# Patient Record
Sex: MALE | Race: WHITE | Employment: FULL TIME | URBAN - METROPOLITAN AREA
[De-identification: names, ages, dates, MRNs, and addresses within clinical notes are randomized per-mention and may not be internally consistent; named-entity substitution may affect disease eponyms.]

---

## 2018-08-14 ENCOUNTER — HOSPITAL ENCOUNTER (OUTPATIENT)
Dept: GENERAL RADIOLOGY | Age: 63
Discharge: HOME | End: 2018-08-16
Payer: COMMERCIAL

## 2018-08-14 ENCOUNTER — HOSPITAL ENCOUNTER (OUTPATIENT)
Age: 63
Discharge: HOME | End: 2018-08-16
Payer: COMMERCIAL

## 2018-08-14 DIAGNOSIS — T14.90XA INJURY: ICD-10-CM

## 2018-08-14 PROCEDURE — 73130 X-RAY EXAM OF HAND: CPT

## 2020-04-09 RX ORDER — TESTOSTERONE 16.2 MG/G
GEL TRANSDERMAL
Qty: 1 BOTTLE | Refills: 4 | OUTPATIENT
Start: 2020-04-09

## 2021-11-01 ENCOUNTER — ANESTHESIA EVENT (OUTPATIENT)
Dept: OPERATING ROOM | Age: 66
End: 2021-11-01
Payer: MEDICARE

## 2021-11-02 ENCOUNTER — HOSPITAL ENCOUNTER (OUTPATIENT)
Age: 66
Setting detail: OUTPATIENT SURGERY
Discharge: HOME | End: 2021-11-02
Attending: COLON & RECTAL SURGERY | Admitting: COLON & RECTAL SURGERY
Payer: MEDICARE

## 2021-11-02 ENCOUNTER — ANESTHESIA (OUTPATIENT)
Dept: OPERATING ROOM | Age: 66
End: 2021-11-02
Payer: MEDICARE

## 2021-11-02 VITALS
HEART RATE: 64 BPM | HEIGHT: 67 IN | TEMPERATURE: 97.2 F | BODY MASS INDEX: 31.78 KG/M2 | DIASTOLIC BLOOD PRESSURE: 81 MMHG | WEIGHT: 202.5 LBS | OXYGEN SATURATION: 95 % | SYSTOLIC BLOOD PRESSURE: 125 MMHG | RESPIRATION RATE: 15 BRPM

## 2021-11-02 VITALS
OXYGEN SATURATION: 96 % | DIASTOLIC BLOOD PRESSURE: 72 MMHG | SYSTOLIC BLOOD PRESSURE: 137 MMHG | RESPIRATION RATE: 15 BRPM

## 2021-11-02 PROCEDURE — 3609027000 HC COLONOSCOPY: Performed by: COLON & RECTAL SURGERY

## 2021-11-02 PROCEDURE — 6360000002 HC RX W HCPCS: Performed by: NURSE ANESTHETIST, CERTIFIED REGISTERED

## 2021-11-02 PROCEDURE — 7100000011 HC PHASE II RECOVERY - ADDTL 15 MIN: Performed by: COLON & RECTAL SURGERY

## 2021-11-02 PROCEDURE — 2580000003 HC RX 258: Performed by: NURSE ANESTHETIST, CERTIFIED REGISTERED

## 2021-11-02 PROCEDURE — 2709999900 HC NON-CHARGEABLE SUPPLY: Performed by: COLON & RECTAL SURGERY

## 2021-11-02 PROCEDURE — 2500000003 HC RX 250 WO HCPCS: Performed by: NURSE ANESTHETIST, CERTIFIED REGISTERED

## 2021-11-02 PROCEDURE — 3700000000 HC ANESTHESIA ATTENDED CARE: Performed by: COLON & RECTAL SURGERY

## 2021-11-02 PROCEDURE — 2720000010 HC SURG SUPPLY STERILE: Performed by: COLON & RECTAL SURGERY

## 2021-11-02 PROCEDURE — 3700000001 HC ADD 15 MINUTES (ANESTHESIA): Performed by: COLON & RECTAL SURGERY

## 2021-11-02 PROCEDURE — 7100000010 HC PHASE II RECOVERY - FIRST 15 MIN: Performed by: COLON & RECTAL SURGERY

## 2021-11-02 PROCEDURE — 2580000003 HC RX 258: Performed by: ANESTHESIOLOGY

## 2021-11-02 RX ORDER — SODIUM CHLORIDE 0.9 % (FLUSH) 0.9 %
10 SYRINGE (ML) INJECTION PRN
Status: DISCONTINUED | OUTPATIENT
Start: 2021-11-02 | End: 2021-11-02 | Stop reason: HOSPADM

## 2021-11-02 RX ORDER — LIDOCAINE HYDROCHLORIDE 10 MG/ML
1 INJECTION, SOLUTION EPIDURAL; INFILTRATION; INTRACAUDAL; PERINEURAL
Status: DISCONTINUED | OUTPATIENT
Start: 2021-11-03 | End: 2021-11-02 | Stop reason: HOSPADM

## 2021-11-02 RX ORDER — MELOXICAM 15 MG/1
15 TABLET ORAL DAILY
COMMUNITY
Start: 2021-10-19

## 2021-11-02 RX ORDER — LIDOCAINE HYDROCHLORIDE 20 MG/ML
INJECTION, SOLUTION EPIDURAL; INFILTRATION; INTRACAUDAL; PERINEURAL PRN
Status: DISCONTINUED | OUTPATIENT
Start: 2021-11-02 | End: 2021-11-02 | Stop reason: SDUPTHER

## 2021-11-02 RX ORDER — ONDANSETRON 2 MG/ML
4 INJECTION INTRAMUSCULAR; INTRAVENOUS
Status: DISCONTINUED | OUTPATIENT
Start: 2021-11-02 | End: 2021-11-02 | Stop reason: HOSPADM

## 2021-11-02 RX ORDER — OMEGA-3 FATTY ACIDS CAP DELAYED RELEASE 1000 MG 1000 MG
3000 CAPSULE DELAYED RELEASE ORAL
COMMUNITY

## 2021-11-02 RX ORDER — ALPRAZOLAM 0.5 MG/1
0.5 TABLET ORAL NIGHTLY PRN
COMMUNITY
Start: 2021-09-26

## 2021-11-02 RX ORDER — TRAZODONE HYDROCHLORIDE 100 MG/1
100 TABLET ORAL NIGHTLY
COMMUNITY
Start: 2021-10-24

## 2021-11-02 RX ORDER — SODIUM CHLORIDE 0.9 % (FLUSH) 0.9 %
10 SYRINGE (ML) INJECTION EVERY 12 HOURS SCHEDULED
Status: DISCONTINUED | OUTPATIENT
Start: 2021-11-02 | End: 2021-11-02 | Stop reason: HOSPADM

## 2021-11-02 RX ORDER — TRAMADOL HYDROCHLORIDE 50 MG/1
50 TABLET ORAL NIGHTLY PRN
COMMUNITY
Start: 2021-09-29

## 2021-11-02 RX ORDER — HYDROMORPHONE HYDROCHLORIDE 1 MG/ML
0.25 INJECTION, SOLUTION INTRAMUSCULAR; INTRAVENOUS; SUBCUTANEOUS EVERY 5 MIN PRN
Status: DISCONTINUED | OUTPATIENT
Start: 2021-11-02 | End: 2021-11-02 | Stop reason: HOSPADM

## 2021-11-02 RX ORDER — SODIUM CHLORIDE, SODIUM LACTATE, POTASSIUM CHLORIDE, CALCIUM CHLORIDE 600; 310; 30; 20 MG/100ML; MG/100ML; MG/100ML; MG/100ML
INJECTION, SOLUTION INTRAVENOUS CONTINUOUS PRN
Status: DISCONTINUED | OUTPATIENT
Start: 2021-11-02 | End: 2021-11-02 | Stop reason: SDUPTHER

## 2021-11-02 RX ORDER — PROPOFOL 10 MG/ML
INJECTION, EMULSION INTRAVENOUS PRN
Status: DISCONTINUED | OUTPATIENT
Start: 2021-11-02 | End: 2021-11-02 | Stop reason: SDUPTHER

## 2021-11-02 RX ORDER — NIFEDIPINE 30 MG/1
30 TABLET, EXTENDED RELEASE ORAL DAILY
COMMUNITY
Start: 2021-09-29

## 2021-11-02 RX ORDER — FENTANYL CITRATE 50 UG/ML
INJECTION, SOLUTION INTRAMUSCULAR; INTRAVENOUS PRN
Status: DISCONTINUED | OUTPATIENT
Start: 2021-11-02 | End: 2021-11-02 | Stop reason: SDUPTHER

## 2021-11-02 RX ORDER — SODIUM CHLORIDE 9 MG/ML
25 INJECTION, SOLUTION INTRAVENOUS PRN
Status: DISCONTINUED | OUTPATIENT
Start: 2021-11-02 | End: 2021-11-02 | Stop reason: HOSPADM

## 2021-11-02 RX ORDER — LEVOTHYROXINE SODIUM 0.07 MG/1
75 TABLET ORAL DAILY
COMMUNITY
Start: 2021-10-10

## 2021-11-02 RX ORDER — SODIUM CHLORIDE 9 MG/ML
INJECTION, SOLUTION INTRAVENOUS CONTINUOUS
Status: DISCONTINUED | OUTPATIENT
Start: 2021-11-03 | End: 2021-11-02

## 2021-11-02 RX ORDER — FENOFIBRATE 145 MG/1
145 TABLET, COATED ORAL DAILY
COMMUNITY
Start: 2021-09-29

## 2021-11-02 RX ORDER — TAMSULOSIN HYDROCHLORIDE 0.4 MG/1
0.4 CAPSULE ORAL DAILY
COMMUNITY
Start: 2021-10-13

## 2021-11-02 RX ORDER — FENTANYL CITRATE 50 UG/ML
25 INJECTION, SOLUTION INTRAMUSCULAR; INTRAVENOUS EVERY 5 MIN PRN
Status: DISCONTINUED | OUTPATIENT
Start: 2021-11-02 | End: 2021-11-02 | Stop reason: HOSPADM

## 2021-11-02 RX ORDER — SODIUM CHLORIDE, SODIUM LACTATE, POTASSIUM CHLORIDE, CALCIUM CHLORIDE 600; 310; 30; 20 MG/100ML; MG/100ML; MG/100ML; MG/100ML
INJECTION, SOLUTION INTRAVENOUS CONTINUOUS
Status: DISCONTINUED | OUTPATIENT
Start: 2021-11-03 | End: 2021-11-02 | Stop reason: HOSPADM

## 2021-11-02 RX ADMIN — LIDOCAINE HYDROCHLORIDE 50 MG: 20 INJECTION, SOLUTION EPIDURAL; INFILTRATION; INTRACAUDAL; PERINEURAL at 07:37

## 2021-11-02 RX ADMIN — SODIUM CHLORIDE, POTASSIUM CHLORIDE, SODIUM LACTATE AND CALCIUM CHLORIDE: 600; 310; 30; 20 INJECTION, SOLUTION INTRAVENOUS at 07:37

## 2021-11-02 RX ADMIN — SODIUM CHLORIDE, POTASSIUM CHLORIDE, SODIUM LACTATE AND CALCIUM CHLORIDE: 600; 310; 30; 20 INJECTION, SOLUTION INTRAVENOUS at 06:56

## 2021-11-02 RX ADMIN — PROPOFOL 40 MG: 10 INJECTION, EMULSION INTRAVENOUS at 07:49

## 2021-11-02 RX ADMIN — Medication 50 MCG: at 07:42

## 2021-11-02 RX ADMIN — PROPOFOL 40 MG: 10 INJECTION, EMULSION INTRAVENOUS at 07:56

## 2021-11-02 RX ADMIN — Medication 50 MCG: at 07:39

## 2021-11-02 RX ADMIN — PROPOFOL 70 MG: 10 INJECTION, EMULSION INTRAVENOUS at 07:43

## 2021-11-02 RX ADMIN — PROPOFOL 70 MG: 10 INJECTION, EMULSION INTRAVENOUS at 07:37

## 2021-11-02 ASSESSMENT — PULMONARY FUNCTION TESTS
PIF_VALUE: 1
PIF_VALUE: 0
PIF_VALUE: 1
PIF_VALUE: 0
PIF_VALUE: 0
PIF_VALUE: 1
PIF_VALUE: 0
PIF_VALUE: 1
PIF_VALUE: 0
PIF_VALUE: 1
PIF_VALUE: 0
PIF_VALUE: 1
PIF_VALUE: 0
PIF_VALUE: 1
PIF_VALUE: 1
PIF_VALUE: 0
PIF_VALUE: 1

## 2021-11-02 ASSESSMENT — PAIN SCALES - GENERAL
PAINLEVEL_OUTOF10: 0

## 2021-11-02 ASSESSMENT — PAIN - FUNCTIONAL ASSESSMENT: PAIN_FUNCTIONAL_ASSESSMENT: 0-10

## 2021-11-02 NOTE — OP NOTE
Operative Note      Patient: Mikayla Ramos  YOB: 1955  MRN: [id number]    Date of Procedure: 11/2/2021    Pre-Op Diagnosis: DX SCREENING, BLEEDING, HEMORRHOIDS, CHANGE IN BOWEL HABITS    Post-Op Diagnosis: Same       Procedure(s):  COLONOSCOPY DIAGNOSTIC  POSSIBLE      APC TREATMENT  HEMORRHOIDECTOMY    Surgeon(s):  Lisa Molina MD    Assistant:   Resident: Amy Lynch DO    Anesthesia: Monitor Anesthesia Care    Estimated Blood Loss (mL): Minimal    Complications: None    Specimens:   * No specimens in log *    Implants:  * No implants in log *      Drains: * No LDAs found *    Findings: WC-IV. Mild diverticulosis in the transverse to sigmoid colon, grade I internal hemorrhoids. Indication: Patient having small amounts rectal bleeding with bowel movements and known internal and external hemorrhoids, also needing routine screening colonoscopy. Consent obtained in office. Detailed Description of Procedure: The patient was given monitored anesthesia care. The patient was given oxygen by nasal cannula. The colonoscope was inserted per rectum and advanced under direct vision to the cecum without difficulty. Findings:  Cecum/Ascending colon: normal    Transverse colon: abnormal: mild diverticulosis    Descending/Sigmoid colon: abnormal: mild diverticulosis    Rectum/Anus: examined in normal and retroflexed positions and was abnormal: grade I internal hemorrhoids, small non-thrombosed external hemorrhoids. APC cautery of the internal hemorrhoids was performed using a circumferential tip energy device in a clockwise fashion around the anus just proximal to the dentate line. No complications or bleeding encountered. The colon was decompressed and the scope was removed. The patient tolerated the procedure well. Recommendations: Follow up in 10 years for repeat colonoscopy, follow up in office in one month to evaluate hemorrhoids.     Dr. Tao Estrella was present for the entire case.    Electronically signed by Nii Faulkner DO on 11/2/2021 at 8:09 AM  I Dr. Ryann Jesus was present throughout and performed the entire procedure. Campbell Alanis  Colorectal Surgery

## 2021-11-02 NOTE — ANESTHESIA POSTPROCEDURE EVALUATION
Department of Anesthesiology  Postprocedure Note    Patient: Rupinder Roy  MRN: [id number]  YOB: 1955  Date of evaluation: 11/2/2021  Time:  2:02 PM     Procedure Summary     Date: 11/02/21 Room / Location: [address]    Anesthesia Start: 4215 Anesthesia Stop: 0813    Procedures:       COLONOSCOPY DIAGNOSTIC (N/A Anus)      POSSIBLE      APC TREATMENT  HEMORRHOIDECTOMY (N/A ) Diagnosis: (DX SCREENING, BLEEDING, HEMORRHOIDS, CHANGE IN BOWEL HABITS)    Surgeons: Lenora Bush MD Responsible Provider: Christa Kyle DO    Anesthesia Type: general, MAC ASA Status: 2          Anesthesia Type: general, MAC    Veroncia Phase I:      Veronica Phase II:      Last vitals: Reviewed and per EMR flowsheets.        Anesthesia Post Evaluation    Patient location during evaluation: PACU  Patient participation: complete - patient participated  Level of consciousness: awake and alert  Airway patency: patent  Nausea & Vomiting: no nausea and no vomiting  Complications: no  Cardiovascular status: hemodynamically stable  Respiratory status: acceptable  Hydration status: stable

## 2021-11-02 NOTE — ANESTHESIA PRE PROCEDURE
Department of Anesthesiology  Preprocedure Note       Name:  Sarah Brady   Age:  77 y.o.  :  1955                                          MRN:  [id number]         Date:  2021      Surgeon: Asim Bowman):  Jack Phillip MD    Procedure: Procedure(s):  COLONOSCOPY DIAGNOSTIC  POSSIBLE      APC TREATMENT  HEMORRHOIDECTOMY    Medications prior to admission:   Prior to Admission medications    Medication Sig Start Date End Date Taking? Authorizing Provider   ALPRAZolam Valaria Paling) 0.5 MG tablet Take 0.5 mg by mouth nightly as needed. 21  Yes Historical Provider, MD   vitamin D3 (CHOLECALCIFEROL) 125 MCG (5000 UT) TABS tablet Take 5,000 Units by mouth daily   Yes Historical Provider, MD   fenofibrate (TRICOR) 145 MG tablet Take 145 mg by mouth daily 21  Yes Historical Provider, MD   levothyroxine (SYNTHROID) 75 MCG tablet Take 75 mcg by mouth daily 10/10/21  Yes Historical Provider, MD   mirabegron (MYRBETRIQ) 50 MG TB24 Take 50 mg by mouth daily 3/8/21  Yes Historical Provider, MD   NIFEdipine (PROCARDIA XL) 30 MG extended release tablet Take 30 mg by mouth daily 21  Yes Historical Provider, MD   tamsulosin (FLOMAX) 0.4 MG capsule Take 0.4 mg by mouth daily 10/13/21  Yes Historical Provider, MD   traZODone (DESYREL) 100 MG tablet Take 100 mg by mouth nightly 10/24/21  Yes Historical Provider, MD   Omega-3 Fatty Acids (FISH OIL) 1000 MG CPDR Take 3,000 mg by mouth   Yes Historical Provider, MD   bisacodyl (DULCOLAX) 5 MG EC tablet Take 5 mg by mouth nightly as needed for Constipation   Yes Historical Provider, MD   meloxicam (MOBIC) 15 MG tablet Take 15 mg by mouth daily 10/19/21   Historical Provider, MD   traMADol (ULTRAM) 50 MG tablet Take 50 mg by mouth nightly as needed for Pain.  21   Historical Provider, MD       Current medications:    Current Facility-Administered Medications   Medication Dose Route Frequency Provider Last Rate Last Admin    [START ON 11/3/2021] lactated ringers infusion IntraVENous Continuous Erika Maurisio,  mL/hr at 11/02/21 0656 New Bag at 11/02/21 0656    sodium chloride flush 0.9 % injection 10 mL  10 mL IntraVENous 2 times per day Chang Talley DO        sodium chloride flush 0.9 % injection 10 mL  10 mL IntraVENous PRN Erika Maurisio, DO        0.9 % sodium chloride infusion  25 mL IntraVENous PRN Erika Maurisio, DO        [START ON 11/3/2021] lidocaine PF 1 % injection 1 mL  1 mL IntraDERmal Once PRN Erika Maurisio, DO           Allergies:  No Known Allergies    Problem List:  There is no problem list on file for this patient. Past Medical History:        Diagnosis Date    Arthritis     Bleeding hemorrhoid     Enlarged prostate     Hyperlipidemia     Sarcoidosis     Thyroid disease        Past Surgical History:        Procedure Laterality Date    CHOLECYSTECTOMY      COLONOSCOPY      FRACTURE SURGERY      right elbow bone chips removed    JOINT REPLACEMENT Left 04/2021    KNEE CARTILAGE SURGERY Right     LUNG BIOPSY Right     ROTATOR CUFF REPAIR Left     x 2    ROTATOR CUFF REPAIR Right     TOE SURGERY Bilateral     great toe joint replacement    TONSILLECTOMY         Social History:    Social History     Tobacco Use    Smoking status: Never Smoker    Smokeless tobacco: Former User     Types: Chew   Substance Use Topics    Alcohol use:  Yes     Alcohol/week: 6.0 standard drinks     Types: 6 Glasses of wine per week                                Counseling given: Not Answered      Vital Signs (Current):   Vitals:    11/02/21 0621 11/02/21 0623   BP:  (!) 141/85   Pulse:  81   Resp:  16   Temp:  97.3 °F (36.3 °C)   TempSrc:  Temporal   SpO2:  98%   Weight: 202 lb 8 oz (91.9 kg)    Height: 5' 7\" (1.702 m)                                               BP Readings from Last 3 Encounters:   11/02/21 (!) 141/85       NPO Status: Time of last liquid consumption: 2200                        Time of last solid consumption: 0800                        Date of last liquid consumption: 11/01/21                        Date of last solid food consumption: 11/01/21    BMI:   Wt Readings from Last 3 Encounters:   11/02/21 202 lb 8 oz (91.9 kg)     Body mass index is 31.72 kg/m². CBC: No results found for: WBC, RBC, HGB, HCT, MCV, RDW, PLT    CMP: No results found for: NA, K, CL, CO2, BUN, CREATININE, GFRAA, AGRATIO, LABGLOM, GLUCOSE, PROT, CALCIUM, BILITOT, ALKPHOS, AST, ALT    POC Tests: No results for input(s): POCGLU, POCNA, POCK, POCCL, POCBUN, POCHEMO, POCHCT in the last 72 hours. Coags: No results found for: PROTIME, INR, APTT    HCG (If Applicable): No results found for: PREGTESTUR, PREGSERUM, HCG, HCGQUANT     ABGs: No results found for: PHART, PO2ART, BOA6VFH, KLY0KHU, BEART, W9HHXCDE     Type & Screen (If Applicable):  No results found for: LABABO, LABRH    Drug/Infectious Status (If Applicable):  No results found for: HIV, HEPCAB    COVID-19 Screening (If Applicable): No results found for: COVID19        Anesthesia Evaluation   no history of anesthetic complications:   Airway: Mallampati: III  TM distance: >3 FB   Neck ROM: full  Mouth opening: > = 3 FB Dental:          Pulmonary:normal exam                              ROS comment: Sarcoidosis     Cardiovascular:  Exercise tolerance: good (>4 METS),   (+) hypertension:,     (-)  angina                Neuro/Psych:   (+) depression/anxiety             GI/Hepatic/Renal: Neg GI/Hepatic/Renal ROS            Endo/Other: Negative Endo/Other ROS                    Abdominal:             Vascular: Other Findings:             Anesthesia Plan      TIVA     ASA 2       Induction: intravenous. MIPS: Postoperative opioids intended and Prophylactic antiemetics administered. Anesthetic plan and risks discussed with patient. Plan discussed with CRNA.     Attending anesthesiologist reviewed and agrees with Bob Plummer MD 11/2/2021

## 2022-10-28 ENCOUNTER — HOSPITAL ENCOUNTER (EMERGENCY)
Age: 67
Discharge: HOME | End: 2022-10-28
Attending: EMERGENCY MEDICINE
Payer: MEDICARE

## 2022-10-28 ENCOUNTER — APPOINTMENT (OUTPATIENT)
Dept: GENERAL RADIOLOGY | Age: 67
End: 2022-10-28
Payer: MEDICARE

## 2022-10-28 VITALS
BODY MASS INDEX: 30.07 KG/M2 | WEIGHT: 192 LBS | OXYGEN SATURATION: 93 % | SYSTOLIC BLOOD PRESSURE: 133 MMHG | RESPIRATION RATE: 22 BRPM | TEMPERATURE: 97.9 F | HEART RATE: 75 BPM | DIASTOLIC BLOOD PRESSURE: 79 MMHG

## 2022-10-28 DIAGNOSIS — R55 NEAR SYNCOPE: Primary | ICD-10-CM

## 2022-10-28 LAB
ABSOLUTE EOS #: 0.18 K/UL (ref 0–0.44)
ABSOLUTE IMMATURE GRANULOCYTE: 0.02 K/UL (ref 0–0.3)
ABSOLUTE LYMPH #: 1.5 K/UL (ref 1.1–3.7)
ABSOLUTE MONO #: 0.43 K/UL (ref 0.1–1.2)
ANION GAP SERPL CALCULATED.3IONS-SCNC: 11 MMOL/L (ref 9–17)
BASOPHILS # BLD: 1 % (ref 0–2)
BASOPHILS ABSOLUTE: 0.06 K/UL (ref 0–0.2)
BUN BLDV-MCNC: 18 MG/DL (ref 8–23)
BUN/CREAT BLD: 19 (ref 9–20)
CALCIUM SERPL-MCNC: 9.1 MG/DL (ref 8.6–10.4)
CHLORIDE BLD-SCNC: 106 MMOL/L (ref 98–107)
CO2: 22 MMOL/L (ref 20–31)
CREAT SERPL-MCNC: 0.96 MG/DL (ref 0.7–1.2)
EOSINOPHILS RELATIVE PERCENT: 4 % (ref 1–4)
GFR SERPL CREATININE-BSD FRML MDRD: >60 ML/MIN/1.73M2
GLUCOSE BLD-MCNC: 107 MG/DL (ref 70–99)
HCT VFR BLD CALC: 46.7 % (ref 40.7–50.3)
HEMOGLOBIN: 14.6 G/DL (ref 13–17)
IMMATURE GRANULOCYTES: 0 %
LYMPHOCYTES # BLD: 29 % (ref 24–43)
MCH RBC QN AUTO: 25.8 PG (ref 25.2–33.5)
MCHC RBC AUTO-ENTMCNC: 31.3 G/DL (ref 28.4–34.8)
MCV RBC AUTO: 82.5 FL (ref 82.6–102.9)
MONOCYTES # BLD: 8 % (ref 3–12)
MYOGLOBIN: 21 NG/ML (ref 28–72)
MYOGLOBIN: 26 NG/ML (ref 28–72)
NRBC AUTOMATED: 0 PER 100 WBC
PDW BLD-RTO: 14.6 % (ref 11.8–14.4)
PLATELET # BLD: 238 K/UL (ref 138–453)
PMV BLD AUTO: 9.4 FL (ref 8.1–13.5)
POTASSIUM SERPL-SCNC: 4 MMOL/L (ref 3.7–5.3)
RBC # BLD: 5.66 M/UL (ref 4.21–5.77)
RBC # BLD: ABNORMAL 10*6/UL
SEG NEUTROPHILS: 58 % (ref 36–65)
SEGMENTED NEUTROPHILS ABSOLUTE COUNT: 3 K/UL (ref 1.5–8.1)
SODIUM BLD-SCNC: 139 MMOL/L (ref 135–144)
TROPONIN, HIGH SENSITIVITY: 10 NG/L (ref 0–22)
TROPONIN, HIGH SENSITIVITY: 7 NG/L (ref 0–22)
WBC # BLD: 5.2 K/UL (ref 3.5–11.3)

## 2022-10-28 PROCEDURE — 85025 COMPLETE CBC W/AUTO DIFF WBC: CPT

## 2022-10-28 PROCEDURE — 99285 EMERGENCY DEPT VISIT HI MDM: CPT

## 2022-10-28 PROCEDURE — 84484 ASSAY OF TROPONIN QUANT: CPT

## 2022-10-28 PROCEDURE — 80048 BASIC METABOLIC PNL TOTAL CA: CPT

## 2022-10-28 PROCEDURE — 83874 ASSAY OF MYOGLOBIN: CPT

## 2022-10-28 PROCEDURE — 71045 X-RAY EXAM CHEST 1 VIEW: CPT

## 2022-10-28 PROCEDURE — 93005 ELECTROCARDIOGRAM TRACING: CPT | Performed by: EMERGENCY MEDICINE

## 2022-10-28 ASSESSMENT — ENCOUNTER SYMPTOMS
EYE DISCHARGE: 0
VOMITING: 0
DIARRHEA: 0
CONSTIPATION: 0
SHORTNESS OF BREATH: 0
COUGH: 0
FACIAL SWELLING: 0
ABDOMINAL PAIN: 0
COLOR CHANGE: 0
EYE REDNESS: 0

## 2022-10-28 ASSESSMENT — PAIN - FUNCTIONAL ASSESSMENT: PAIN_FUNCTIONAL_ASSESSMENT: NONE - DENIES PAIN

## 2022-10-28 NOTE — ED NOTES
Patient reports he was at the grocery store and felt his BP drop multiple times and felt dizzy, patient presents to ED denying chest pain, dizziness, and GI issues.  Continue to monitor     Monika Meyers RN  10/28/22 5311

## 2022-10-28 NOTE — ED PROVIDER NOTES
[address] [place] [address] ED  EMERGENCY DEPARTMENT ENCOUNTER      Pt Name: Haley Pena  MRN: [id number]  [place] 1955  Date of evaluation: 10/28/2022  Provider: Yasmeen Oreilly MD    CHIEF COMPLAINT       Chief Complaint   Patient presents with    Chest Pain     Onset this am    Dizziness         HISTORY OF PRESENT ILLNESS  (Location/Symptom, Timing/Onset, Context/Setting, Quality, Duration, Modifying Factors, Severity.)   Haley Pena is a 79 y.o. male who presents to the emergency department for an episode of nearly passing out. He was at a store and had just eaten breakfast before going to the store. The episode lasted he states about 5 seconds. He did not have chest pain and has none now. He did not have any palpitations. He felt dizzy and felt like he was going to pass out. He did not pass out. He feels back to normal now. It happened just before coming into the emergency department. Nursing Notes were reviewed. ALLERGIES     Patient has no known allergies. CURRENT MEDICATIONS       Previous Medications    ALPRAZOLAM (XANAX) 0.5 MG TABLET    Take 0.5 mg by mouth nightly as needed. BISACODYL (DULCOLAX) 5 MG EC TABLET    Take 5 mg by mouth nightly as needed for Constipation    FENOFIBRATE (TRICOR) 145 MG TABLET    Take 145 mg by mouth daily    LEVOTHYROXINE (SYNTHROID) 75 MCG TABLET    Take 75 mcg by mouth daily    MELOXICAM (MOBIC) 15 MG TABLET    Take 15 mg by mouth daily    MIRABEGRON (MYRBETRIQ) 50 MG TB24    Take 50 mg by mouth daily    NIFEDIPINE (PROCARDIA XL) 30 MG EXTENDED RELEASE TABLET    Take 30 mg by mouth daily    OMEGA-3 FATTY ACIDS (FISH OIL) 1000 MG CPDR    Take 3,000 mg by mouth    TAMSULOSIN (FLOMAX) 0.4 MG CAPSULE    Take 0.4 mg by mouth daily    TRAMADOL (ULTRAM) 50 MG TABLET    Take 50 mg by mouth nightly as needed for Pain.     TRAZODONE (DESYREL) 100 MG TABLET    Take 100 mg by mouth nightly    VITAMIN D3 (CHOLECALCIFEROL) 125 MCG (5000 UT) TABS TABLET    Take 5,000 Units by mouth daily       PAST MEDICAL HISTORY         Diagnosis Date    Arthritis     Bleeding hemorrhoid     Enlarged prostate     Hyperlipidemia     Sarcoidosis     Thyroid disease        SURGICAL HISTORY           Procedure Laterality Date    CHOLECYSTECTOMY      COLONOSCOPY      COLONOSCOPY N/A 11/02/2021    COLONOSCOPY DIAGNOSTIC performed by Keegan Garcia MD at [address] [place] [address]      right elbow bone chips removed    HAND SURGERY Left     JOINT REPLACEMENT Left 04/2021    KNEE CARTILAGE SURGERY Right     KNEE SURGERY Left     LUNG BIOPSY Right     ROTATOR CUFF REPAIR Left     x 2    ROTATOR CUFF REPAIR Right     TOE SURGERY Bilateral     great toe joint replacement    TONSILLECTOMY           FAMILY HISTORY     History reviewed. No pertinent family history. No family status information on file. SOCIAL HISTORY      reports that he has never smoked. He quit smokeless tobacco use about 21 years ago. His smokeless tobacco use included chew. He reports current alcohol use of about 6.0 standard drinks per week. REVIEW OF SYSTEMS    (2-9 systems for level 4, 10 or more for level 5)     Review of Systems   Constitutional:  Negative for chills, fatigue and fever. HENT:  Negative for congestion, ear discharge and facial swelling. Eyes:  Negative for discharge and redness. Respiratory:  Negative for cough and shortness of breath. Cardiovascular:  Negative for chest pain. Gastrointestinal:  Negative for abdominal pain, constipation, diarrhea and vomiting. Genitourinary:  Negative for dysuria and hematuria. Musculoskeletal:  Negative for arthralgias. Skin:  Negative for color change and rash. Neurological:  Negative for syncope, numbness and headaches. Hematological:  Negative for adenopathy. Psychiatric/Behavioral:  Negative for confusion. The patient is not nervous/anxious. Except as noted above the remainder of the review of systems was reviewed and negative.      PHYSICAL EXAM    (up to 7 for level 4, 8 or more for level 5)     Vitals:    10/28/22 1244 10/28/22 1500   BP: 129/81 133/79   Pulse: 79 75   Resp: 20 22   Temp: 97.9 °F (36.6 °C)    TempSrc: Oral    SpO2: 97% 93%   Weight: 192 lb (87.1 kg)        Physical Exam  Vitals reviewed. Constitutional:       General: He is not in acute distress. Appearance: He is well-developed. He is not diaphoretic. HENT:      Head: Normocephalic and atraumatic. Eyes:      General: No scleral icterus. Right eye: No discharge. Left eye: No discharge. Cardiovascular:      Rate and Rhythm: Normal rate and regular rhythm. Pulmonary:      Effort: Pulmonary effort is normal. No respiratory distress. Breath sounds: Normal breath sounds. No stridor. No wheezing or rales. Abdominal:      General: There is no distension. Palpations: Abdomen is soft. Tenderness: There is no abdominal tenderness. Musculoskeletal:         General: Normal range of motion. Cervical back: Neck supple. Lymphadenopathy:      Cervical: No cervical adenopathy. Skin:     General: Skin is warm and dry. Findings: No erythema or rash. Neurological:      Mental Status: He is alert and oriented to person, place, and time. Psychiatric:         Behavior: Behavior normal.           DIAGNOSTIC RESULTS     EKG: All EKG's are interpreted by the Emergency Department Physician who either signs or Co-signs this chart in the absence of a cardiologist.    EKG on my interpretation shows sinus rhythm without acute    RADIOLOGY:   Non-plain film images such as CT, Ultrasound and MRI are read by the radiologist. Plain radiographic images are visualized and preliminarily interpreted by the emergency physician with the below findings:    Interpretation per the Radiologist below, if available at the time of this note:    XR CHEST PORTABLE    Result Date: 10/28/2022  EXAMINATION: [address] 349 10/28/2022 2:23 pm COMPARISON: None. HISTORY: ORDERING SYSTEM PROVIDED HISTORY: Near syncope TECHNOLOGIST PROVIDED HISTORY: Near syncope Reason for Exam: Chest Pain; Dizziness Additional signs and symptoms: Chest Pain; Dizziness FINDINGS: Cardiomediastinal silhouette appears unremarkable. No parenchymal consolidation. Streaky bibasilar parenchymal opacities likely representing subsegmental atelectasis or scar. No effusion or pneumothorax. No aggressive osseous lesion. Right perihilar surgical clips. No acute cardiopulmonary process. LABS:  Labs Reviewed   CBC WITH AUTO DIFFERENTIAL - Abnormal; Notable for the following components:       Result Value    MCV 82.5 (*)     RDW 14.6 (*)     All other components within normal limits   BASIC METABOLIC PANEL - Abnormal; Notable for the following components:    Glucose 107 (*)     All other components within normal limits   TROP/MYOGLOBIN - Abnormal; Notable for the following components:    Myoglobin 26 (*)     All other components within normal limits   TROP/MYOGLOBIN - Abnormal; Notable for the following components:    Myoglobin 21 (*)     All other components within normal limits       All other labs were within normal range or not returned as of this dictation. EMERGENCY DEPARTMENT COURSE and DIFFERENTIAL DIAGNOSIS/MDM:   Vitals:    Vitals:    10/28/22 1244 10/28/22 1500   BP: 129/81 133/79   Pulse: 79 75   Resp: 20 22   Temp: 97.9 °F (36.6 °C)    TempSrc: Oral    SpO2: 97% 93%   Weight: 192 lb (87.1 kg)            Medical Decision Making: His work-up is negative including 2 sets of troponin. He is asymptomatic and his symptoms have not recurred. He will be discharged home and has already made a follow-up appointment with his doctor. Treatment diagnosis and follow-up were discussed with the patient      CONSULTS:  None    PROCEDURES:  None    FINAL IMPRESSION      1.  Near syncope          DISPOSITION/PLAN   DISPOSITION Decision To Discharge 10/28/2022 03:47:16 PM      PATIENT REFERRED TO:   Jovanni Hough MD  [address] [place] [address]  [address] [place] [address] Avenue [address] [place] [address] ED  [address] [place] [address]  [phone]    If symptoms worsen    DISCHARGE MEDICATIONS:     New Prescriptions    No medications on file       The care is provided during an unprecedented national emergency due to the novel coronavirus, COVID-19.     (Please note that portions of this note were completed with a voice recognition program.  Efforts were made to edit the dictations but occasionally words are mis-transcribed.)    Nohemy Lima MD  Attending Emergency Physician            Nohemy Lima MD  10/28/22 3163

## 2022-10-28 NOTE — ED NOTES
Writer at bedside for introductions, Pt remains on full cardiac monitor  RR is even and non-labored, NAD noticed at this time     David Reed RN  10/28/22 6794

## 2022-10-29 LAB
EKG ATRIAL RATE: 76 BPM
EKG P AXIS: 40 DEGREES
EKG P-R INTERVAL: 176 MS
EKG Q-T INTERVAL: 368 MS
EKG QRS DURATION: 86 MS
EKG QTC CALCULATION (BAZETT): 414 MS
EKG R AXIS: 4 DEGREES
EKG T AXIS: 46 DEGREES
EKG VENTRICULAR RATE: 76 BPM

## 2023-05-05 ENCOUNTER — HOSPITAL ENCOUNTER (EMERGENCY)
Age: 68
Discharge: HOME | End: 2023-05-05
Attending: EMERGENCY MEDICINE
Payer: MEDICARE

## 2023-05-05 ENCOUNTER — APPOINTMENT (OUTPATIENT)
Dept: GENERAL RADIOLOGY | Age: 68
End: 2023-05-05
Payer: MEDICARE

## 2023-05-05 VITALS
SYSTOLIC BLOOD PRESSURE: 112 MMHG | OXYGEN SATURATION: 97 % | DIASTOLIC BLOOD PRESSURE: 96 MMHG | HEIGHT: 67 IN | TEMPERATURE: 97.5 F | BODY MASS INDEX: 27.31 KG/M2 | WEIGHT: 174 LBS | HEART RATE: 71 BPM | RESPIRATION RATE: 16 BRPM

## 2023-05-05 DIAGNOSIS — S62.346A CLOSED NONDISPLACED FRACTURE OF BASE OF FIFTH METACARPAL BONE OF RIGHT HAND, INITIAL ENCOUNTER: Primary | ICD-10-CM

## 2023-05-05 DIAGNOSIS — S46.911A STRAIN OF RIGHT SHOULDER, INITIAL ENCOUNTER: ICD-10-CM

## 2023-05-05 PROCEDURE — 73030 X-RAY EXAM OF SHOULDER: CPT

## 2023-05-05 PROCEDURE — 99283 EMERGENCY DEPT VISIT LOW MDM: CPT

## 2023-05-05 PROCEDURE — 73130 X-RAY EXAM OF HAND: CPT

## 2023-05-05 PROCEDURE — 29125 APPL SHORT ARM SPLINT STATIC: CPT

## 2023-05-05 PROCEDURE — 6370000000 HC RX 637 (ALT 250 FOR IP): Performed by: EMERGENCY MEDICINE

## 2023-05-05 RX ORDER — TRAMADOL HYDROCHLORIDE 50 MG/1
50 TABLET ORAL ONCE
Status: COMPLETED | OUTPATIENT
Start: 2023-05-05 | End: 2023-05-05

## 2023-05-05 RX ADMIN — TRAMADOL HYDROCHLORIDE 50 MG: 50 TABLET, COATED ORAL at 10:30

## 2023-05-05 ASSESSMENT — PAIN DESCRIPTION - LOCATION: LOCATION: SHOULDER;HAND

## 2023-05-05 ASSESSMENT — ENCOUNTER SYMPTOMS
NAUSEA: 0
VOMITING: 0
EYE DISCHARGE: 0
DIARRHEA: 0
SORE THROAT: 0
COUGH: 0
RHINORRHEA: 0
COLOR CHANGE: 0
EYE REDNESS: 0
SHORTNESS OF BREATH: 0

## 2023-05-05 ASSESSMENT — PAIN SCALES - GENERAL
PAINLEVEL_OUTOF10: 8
PAINLEVEL_OUTOF10: 10

## 2023-05-05 ASSESSMENT — PAIN - FUNCTIONAL ASSESSMENT: PAIN_FUNCTIONAL_ASSESSMENT: 0-10

## 2023-05-05 NOTE — ED PROVIDER NOTES
EMERGENCY DEPARTMENT ENCOUNTER    Pt Name: Madelaine Colón  MRN: [id number]  [place] 1955  Date of evaluation: 5/5/23  CHIEF COMPLAINT       Chief Complaint   Patient presents with    Fall     Mechanical fall down basement steps (approx 4-5 steps); c/o bilateral shoulder pain especially with abduction, R hand pain/swelling     HISTORY OF PRESENT ILLNESS   This is a 68-year-old male who presents with complaints of pain and discomfort in his bilateral shoulders and his right hand. Patient had a fall when he was going down the basement steps, he fell about 4-5 steps, he complains of severe pain and discomfort in his shoulders bilaterally as well as his right hand. REVIEW OF SYSTEMS     Review of Systems   Constitutional:  Negative for chills and fever. HENT:  Negative for rhinorrhea and sore throat. Eyes:  Negative for discharge, redness and visual disturbance. Respiratory:  Negative for cough and shortness of breath. Cardiovascular:  Negative for chest pain, palpitations and leg swelling. Gastrointestinal:  Negative for diarrhea, nausea and vomiting. Musculoskeletal:  Negative for arthralgias, myalgias and neck pain. Shoulder pain, hand pain   Skin:  Negative for color change and rash. Neurological:  Negative for seizures, weakness and headaches. Psychiatric/Behavioral:  Negative for hallucinations, self-injury and suicidal ideas. PASTMEDICAL HISTORY     Past Medical History:   Diagnosis Date    Arthritis     Bleeding hemorrhoid     Enlarged prostate     Hyperlipidemia     Sarcoidosis     Thyroid disease      Past Problem List  There is no problem list on file for this patient.     SURGICAL HISTORY       Past Surgical History:   Procedure Laterality Date    CHOLECYSTECTOMY      COLONOSCOPY      COLONOSCOPY N/A 11/02/2021    COLONOSCOPY DIAGNOSTIC performed by Vicky Styles MD at [facility]      right elbow bone chips removed    HAND SURGERY Left     JOINT

## 2024-08-15 LAB — PROSTATE SPECIFIC ANTIGEN: 1.26 NG/ML

## 2025-02-12 ENCOUNTER — TRANSCRIBE ORDERS (OUTPATIENT)
Dept: ULTRASOUND IMAGING | Age: 70
End: 2025-02-12

## 2025-02-13 ENCOUNTER — TRANSCRIBE ORDERS (OUTPATIENT)
Dept: ADMINISTRATIVE | Age: 70
End: 2025-02-13

## 2025-02-13 DIAGNOSIS — S76.011A STRAIN OF MUSCLE, FASCIA AND TENDON OF RIGHT HIP, INITIAL ENCOUNTER: Primary | ICD-10-CM

## 2025-06-04 ENCOUNTER — OFFICE VISIT (OUTPATIENT)
Age: 70
End: 2025-06-04

## 2025-06-04 VITALS
HEART RATE: 78 BPM | WEIGHT: 200 LBS | HEIGHT: 67 IN | DIASTOLIC BLOOD PRESSURE: 62 MMHG | BODY MASS INDEX: 31.39 KG/M2 | SYSTOLIC BLOOD PRESSURE: 107 MMHG

## 2025-06-04 DIAGNOSIS — M51.16 INTERVERTEBRAL DISC DISORDER WITH RADICULOPATHY OF LUMBAR REGION: ICD-10-CM

## 2025-06-04 DIAGNOSIS — M51.16 HERNIATION OF NUCLEUS PULPOSUS OF LUMBAR INTERVERTEBRAL DISC WITH SCIATICA: Primary | ICD-10-CM

## 2025-06-04 RX ORDER — LANOLIN ALCOHOL/MO/W.PET/CERES
1000 CREAM (GRAM) TOPICAL DAILY
COMMUNITY

## 2025-06-04 RX ORDER — MAGNESIUM 30 MG
30 TABLET ORAL 2 TIMES DAILY
COMMUNITY

## 2025-06-04 ASSESSMENT — ENCOUNTER SYMPTOMS: BACK PAIN: 1

## 2025-06-04 NOTE — PROGRESS NOTES
Review of Systems   Musculoskeletal:  Positive for back pain and joint swelling.   All other systems reviewed and are negative.    
nerve on the right.  There is what appears to be at least moderate central stenosis and foraminal narrowing at the L4-5 level and potentially at the L2-3 level as well.    Assessment and Plan:       Plan: Patient is a 70-year-old male who presents today with a 5-month history of ongoing back and right lower extremity discomfort coursing in an L3 or L4 nerve root distribution consistent with lumbar radiculopathy.  He states his pain is specifically worse with standing and ambulating and improves with sitting.  His pain all began on January 14 after a fall at work, he has no history of back or similar lower extremity pain prior to his fall.  Physical examination today is unremarkable.  Radiographic imaging form of a lumbar MRI does demonstrate severe stenosis along with far lateral disc herniation at the L3-4 level on the right resulting in nerve root impingement.  The MRI is of suboptimal quality however there appeared to be ongoing stenosis at the L2-3 and potentially L4-5 level as well.  At this point in time, the patient's symptoms have been progressing over the past 5 months despite time as well as conservative treatment to formal physical therapy.  His MRI does clearly show at least significant stenosis at 1 level however this MRI is suboptimal quality and therefore we will proceed with imaging of 4 of a lumbar myelogram CT to better evaluate nerve root detail to determine the extent of disease at the other levels as well.  Additionally we will have him undergo lumbar flexion-extension x-rays to rule out any evidence of instability of the lumbar spine.  The studies will help further determine if other levels should be included to surgical intervention or if surgery should be isolated at the L3-4 level.  I did answer any questions he had in the office today and welcomed him to feel free to contact me at any point in the interim should he have any problems or questions we could be of assistance with.

## 2025-07-18 LAB — PROSTATE SPECIFIC ANTIGEN: 1.32 NG/ML

## 2025-08-15 ENCOUNTER — TELEPHONE (OUTPATIENT)
Age: 70
End: 2025-08-15

## 2025-08-15 DIAGNOSIS — M51.16 INTERVERTEBRAL DISC DISORDER WITH RADICULOPATHY OF LUMBAR REGION: Primary | ICD-10-CM

## 2025-08-15 DIAGNOSIS — M51.16 HERNIATION OF NUCLEUS PULPOSUS OF LUMBAR INTERVERTEBRAL DISC WITH SCIATICA: ICD-10-CM

## (undated) DEVICE — YANKAUER,FLEXIBLE HANDLE,REGLR CAPACITY: Brand: MEDLINE INDUSTRIES, INC.

## (undated) DEVICE — FIAPC® PROBE W/ FILTER 2200 C OD 2.3MM/6.9FR; L 2.2M/7.2FT: Brand: ERBE

## (undated) DEVICE — JELLY,LUBE,STERILE,FLIP TOP,TUBE,2-OZ: Brand: MEDLINE

## (undated) DEVICE — SYRINGE IRRIG 60ML SFT PLIABLE BLB EZ TO GRP 1 HND USE W/

## (undated) DEVICE — SPONGE GZ W4XL4IN RAYON POLY FILL CVR W/ NONWOVEN FAB

## (undated) DEVICE — TOWEL,OR,DSP,ST,BLUE,DLX,XR,4/PK,20PK/CS: Brand: MEDLINE

## (undated) DEVICE — SYRINGE MED 50ML LUERLOCK TIP

## (undated) DEVICE — FORCEPS BX L240CM WRK CHN 2.8MM STD CAP W/ NDL MIC MESH

## (undated) DEVICE — PROTECTOR EYE PT SELF ADH NS OPT GRD LF

## (undated) DEVICE — GAUZE,SPONGE,FLUFF,6"X6.75",STRL,5/TRAY: Brand: MEDLINE

## (undated) DEVICE — SHEET, T, LAPAROTOMY, STERILE: Brand: MEDLINE

## (undated) DEVICE — NEEDLE HYPO 25GA L1.5IN BLU POLYPR HUB S STL REG BVL STR

## (undated) DEVICE — ADAPTER TBNG LUER STUB 15 GA INTMED

## (undated) DEVICE — ELECTRODE PT RET AD L9FT HI MOIST COND ADH HYDRGEL CORDED

## (undated) DEVICE — UNDERPANTS MAT L XL SEAMLESS CLR CODE WAISTBAND KNIT

## (undated) DEVICE — TUBING, SUCTION, 1/4" X 12', STRAIGHT: Brand: MEDLINE

## (undated) DEVICE — SYRINGE 20ML LL S/C 50

## (undated) DEVICE — MEDICINE CUP, GRADUATED, STER: Brand: MEDLINE

## (undated) DEVICE — GAUZE,SPONGE,4"X4",16PLY,STRL,LF,10/TRAY: Brand: MEDLINE

## (undated) DEVICE — BLANKET WRM W29.9XL79.1IN UP BODY FORC AIR MISTRAL-AIR

## (undated) DEVICE — SUTURE CHROMIC GUT SZ 2-0 L27IN ABSRB BRN L26MM SH 1/2 CIR G123H

## (undated) DEVICE — TRAP SURG QUAD PARABOLA SLOT DSGN SFTY SCRN TRAPEASE

## (undated) DEVICE — TRAY PREP DRY W/ PREM GLV 2 APPL 6 SPNG 2 UNDPD 1 OVERWRAP

## (undated) DEVICE — BASIN EMSIS 700ML GRAPHITE PLAS KID SHP GRAD

## (undated) DEVICE — Device: Brand: DEFENDO VALVE AND CONNECTOR KIT

## (undated) DEVICE — PAD,ABDOMINAL,5"X9",ST,LF,25/BX: Brand: MEDLINE INDUSTRIES, INC.

## (undated) DEVICE — GOWN,AURORA,NONREINFORCED,LARGE: Brand: MEDLINE

## (undated) DEVICE — CO2 CANNULA,SUPERSOFT, ADLT,7'O2,7'CO2: Brand: MEDLINE

## (undated) DEVICE — MINOR BSIN PK

## (undated) DEVICE — INTENDED FOR TISSUE SEPARATION, AND OTHER PROCEDURES THAT REQUIRE A SHARP SURGICAL BLADE TO PUNCTURE OR CUT.: Brand: BARD-PARKER ® CARBON RIB-BACK BLADES

## (undated) DEVICE — CUP MED 1OZ CLR POLYPR FEED GRAD W/O LID

## (undated) DEVICE — INSERT CUSHION HEAD PRONEVIEW

## (undated) DEVICE — SOLUTION PREP 4OZ 3% H PEROX 1ST AID ANTISEP ORAL DEBRIDING

## (undated) DEVICE — 3M™ STERI-STRIP™ COMPOUND BENZOIN TINCTURE 40 BAGS/CARTON 4 CARTONS/CASE C1544: Brand: 3M™ STERI-STRIP™

## (undated) DEVICE — GLOVE SURG 7.5 11.7IN BEAD CUF LIGHT BRN SENSICARE LTX FREE